# Patient Record
Sex: FEMALE
[De-identification: names, ages, dates, MRNs, and addresses within clinical notes are randomized per-mention and may not be internally consistent; named-entity substitution may affect disease eponyms.]

---

## 2017-12-23 ENCOUNTER — HOSPITAL ENCOUNTER (EMERGENCY)
Dept: HOSPITAL 31 - C.ER | Age: 42
Discharge: HOME | End: 2017-12-23
Payer: COMMERCIAL

## 2017-12-23 VITALS — OXYGEN SATURATION: 97 % | RESPIRATION RATE: 18 BRPM

## 2017-12-23 VITALS — DIASTOLIC BLOOD PRESSURE: 93 MMHG | HEART RATE: 119 BPM | SYSTOLIC BLOOD PRESSURE: 154 MMHG | TEMPERATURE: 98.9 F

## 2017-12-23 DIAGNOSIS — N93.8: Primary | ICD-10-CM

## 2017-12-23 DIAGNOSIS — Z90.710: ICD-10-CM

## 2017-12-23 LAB
ERYTHROCYTE [DISTWIDTH] IN BLOOD BY AUTOMATED COUNT: 13.2 % (ref 11.5–14.5)
HCT VFR BLD CALC: 39.8 % (ref 34–47)
MCH RBC QN AUTO: 28.7 PG (ref 27–31)
MCHC RBC AUTO-ENTMCNC: 34 G/DL (ref 33–37)
MCV RBC AUTO: 84.6 FL (ref 81–99)
PLATELET # BLD: 244 K/UL (ref 130–400)
PMV BLD AUTO: 8.2 FL (ref 7.2–11.7)
WBC # BLD AUTO: 8.3 K/UL (ref 4.8–10.8)

## 2017-12-23 NOTE — C.PDOC
History Of Present Illness


42 yr old female with PMHx of HTN (compliant with meds), presents to the ER for 

vaginal bleeding since last night. Patient is s/p [id number] in 2017 

done at [facility] for O[facility]. Patient states the bleeding started 

after having sexual intercourse, reports has had sexual intercourse since the 

surgery but never had bleeding. Patient reports of mild cramping. Denies fever, 

nausea, vomiting, dysuria, weakness or numbness.


Time Seen by Provider: 17 12:49


Chief Complaint (Nursing): Female Genitourinary


History Per: Patient


History/Exam Limitations: no limitations


Onset/Duration Of Symptoms: Sudden Onset (Last night)





Past Medical History


Reviewed: Historical Data, Nursing Documentation, Vital Signs


Vital Signs: 


 Last Vital Signs











Temp  98.9 F   17 13:30


 


Pulse  119 H  17 13:30


 


Resp  18   17 13:30


 


BP  154/93 H  17 13:30


 


Pulse Ox  97   17 13:30














- Medical History


PMH: Depression, HTN, Hypothyroidism





- [facility] Procedures








FETAL MONITORING NOS (13)


LOW CERVICAL  (13)








Family History: States: No Known Family Hx





- Social History


Hx Alcohol Use: No


Hx Substance Use: No





- Immunization History


Hx Tetanus Toxoid Vaccination: No


Hx Influenza Vaccination: Yes


Hx Pneumococcal Vaccination: No





Review Of Systems


Except As Marked, All Systems Reviewed And Found Negative.


Constitutional: Negative for: Fever


Gastrointestinal: Negative for: Nausea, Vomiting


Genitourinary: Positive for: Vaginal Bleeding.  Negative for: Dysuria


Neurological: Negative for: Weakness, Numbness





Physical Exam





- Physical Exam


Appears: Non-toxic, Other (Anxious)


Skin: Warm, Dry, No Pale, No Rash, No Jaundice


Head: Atraumatic, Normacephalic


Eye(s): bilateral: Normal Inspection, PERRL, EOMI, Other (No palor)


Oral Mucosa: Moist


Respiratory: Normal Breath Sounds


Gastrointestinal/Abdominal: Normal Exam, Soft, No Tenderness, No Guarding, No 

Rebound


Pelvic: Normal Bimanual Exam, Other (RN Mario as chaperone. Clots. No active 

bleeding. No BRB.)


Extremity: Normal ROM, No Swelling


Neurological/Psych: Oriented x3, Normal Speech, Other (Anxious but calm)


Gait: Steady





ED Course And Treatment





- Laboratory Results


Result Diagrams: 


 17 13:39





O2 Sat by Pulse Oximetry: 97 (RA)


Pulse Ox Interpretation: Normal





Progress





- Re-Evaluation


Re-evaluation Note: 





17 13:10


D/W DR GUY LIU OB ON CALL AWARE OF ER FINDINGS, ADVISED PT TO FU W HER OBGYN, 

AVOID INTERCOURSE UNTIL FU


17 14:16


APPEARS COMFORTABLE NAD. PS PREV DX W TACHYCARDIA "USUALLY BC IM NERVOUS IN 

HOSPITALS". DENIES PALPITATIONS, DIZZY, CP OR OTHER ASSOC SX. PENDING GYN ONC 

REEVAL . ADVISED FU, AVOID INTERCOURSE UNTIL FU





- Data Reviewed


Data Reviewed: Lab, EKG





Medical Decision Making


Medical Decision Making: 


PLAN:


* EKG


* CBC








Disposition


Counseled Patient/Family Regarding: Studies Performed, Diagnosis, Need For 

Followup





- Disposition


Referrals: 


YOUR,GYN ONCOLOGIST [Other]


Disposition: HOME/ ROUTINE


Disposition Time: 14:18


Condition: IMPROVED


Instructions:  Dysfunctional Uterine Bleeding (ED)


Forms:  [facility] (English)





- Clinical Impression


Clinical Impression: 


 DUB (dysfunctional uterine bleeding)








- Scribe Statement


The provider has reviewed the documentation as recorded by the Scribe


Carmen Ford


Provider Attestation: 


All medical record entries made by the Scribe were at my direction and 

personally dictated by me. I have reviewed the chart and agree that the record 

accurately reflects my personal performance of the history, physical exam, 

medical decision making, and the department course for this patient. I have 

also personally directed, reviewed, and agree with the discharge instructions 

and disposition.

## 2017-12-26 NOTE — CARD
--------------- APPROVED REPORT --------------





EKG Measurement

Heart [id number]

[id number]

[id number]

[id number]

[id number]



<Conclusion>

Sinus tachycardia

Nonspecific T wave abnormality

Abnormal ECG

## 2018-06-29 ENCOUNTER — HOSPITAL ENCOUNTER (EMERGENCY)
Dept: HOSPITAL 31 - C.ER | Age: 43
Discharge: HOME | End: 2018-06-29
Payer: COMMERCIAL

## 2018-06-29 VITALS
DIASTOLIC BLOOD PRESSURE: 90 MMHG | TEMPERATURE: 98 F | RESPIRATION RATE: 14 BRPM | SYSTOLIC BLOOD PRESSURE: 142 MMHG | OXYGEN SATURATION: 99 % | HEART RATE: 80 BPM

## 2018-06-29 DIAGNOSIS — H10.11: Primary | ICD-10-CM

## 2018-06-29 NOTE — C.PDOC
History Of Present Illness





42 y/o female c/o itching and burning to right eye this evening with watery 

discharge, then rubbed it and now c/o pain. with mild blurry vision.  no fb 

sensation.  pt wears reading glasses, no contact lenses. 


Time Seen by Provider: 18 20:14


Chief Complaint (Nursing): Eye Problem


History Per: Patient


History/Exam Limitations: no limitations


Onset/Duration Of Symptoms: Hrs (3)


Current Symptoms Are (Timing): Still Present


Injury To Eye?: No


Severity: Moderate


Quality: Burning, "Pain"


Wears Contact Lens?: No


Associated Symptoms: denies: Decreased Vision, FB Sensation





Past Medical History


Reviewed: Historical Data, Nursing Documentation, Vital Signs


Vital Signs: 


 Last Vital Signs











Temp  98 F   18 20:01


 


Pulse  80   18 20:01


 


Resp  14   18 20:01


 


BP  142/90   18 20:01


 


Pulse Ox  99   18 22:28














- Medical History


PMH: Depression, HTN, Hypothyroidism





- [facility] Procedures








FETAL MONITORING NOS (13)


LOW CERVICAL  (13)








Family History: States: Unknown Family Hx





- Social History


Hx Alcohol Use: No


Hx Substance Use: No





- Immunization History


Hx Tetanus Toxoid Vaccination: No


Hx Influenza Vaccination: Yes


Hx Pneumococcal Vaccination: No





Review Of Systems


Constitutional: Negative for: Fever, Chills


Eyes: Positive for: Pain, Conjunctivae Inflammation, Other (itching).  Negative 

for: Vision Change


ENT: Negative for: Ear Pain, Nose Pain, Mouth Pain


Skin: Negative for: Rash


Neurological: Negative for: Weakness, Numbness, Headache





Physical Exam





- Physical Exam


Appears: Non-toxic, No Acute Distress


Skin: Warm, Dry


Head: Atraumatic, Normacephalic


Eye(s): bilateral: PERRL, EOMI, right: Other (lateral conjunctival injection), 

left: Normal Inspection


Nose: No Discharge


Neck: Supple


Neurological/Psych: Oriented x3, Normal Speech, Normal Cognition





ED Course And Treatment


O2 Sat by Pulse Oximetry: 99





Medical Decision Making


Medical Decision Making: 





one drop tetracaine applied to right eye, follow by fluorescein strip. no 

uptake noted under wood'slamp; no corneal abrasion noted. pt with itching and 

buning, with irritation after rubbing; tx for allergic conjunctivitis.  





Disposition


Counseled Patient/Family Regarding: Studies Performed, Diagnosis, Rx Given





- Disposition


Referrals: 


Michelle Paula MD [Staff Provider] - 


Carlos Caldera [Staff Provider] - 


Disposition: HOME/ ROUTINE


Disposition Time: 21:28


Condition: GOOD


Additional Instructions: 


Please use one half inch ribbon ointment in right eye 2 times a day. Tylenol or 

Motrin for pain.  Follow up with Dr Paula and Dr Caldera (eye doctor) on 

Monday. Return to ER for any worsening symptoms., 


Prescriptions: 


Erythromycin 0.5% [Ilytocin] 3.5 gm OD BID #1 tube


Instructions:  Conjunctivitis (Noninfectious Pinkeye) (DC)


Forms:  [facility] (English), General Discharge Instructions





- Clinical Impression


Clinical Impression: 


 Conjunctivitis, allergic